# Patient Record
Sex: MALE
[De-identification: names, ages, dates, MRNs, and addresses within clinical notes are randomized per-mention and may not be internally consistent; named-entity substitution may affect disease eponyms.]

---

## 2019-02-24 ENCOUNTER — HOSPITAL ENCOUNTER (EMERGENCY)
Dept: HOSPITAL 95 - ER | Age: 79
Discharge: HOME | End: 2019-02-24
Payer: MEDICARE

## 2019-02-24 VITALS — BODY MASS INDEX: 27.47 KG/M2 | WEIGHT: 175 LBS | HEIGHT: 67 IN

## 2019-02-24 DIAGNOSIS — Z23: ICD-10-CM

## 2019-02-24 DIAGNOSIS — S01.412A: ICD-10-CM

## 2019-02-24 DIAGNOSIS — S01.81XA: ICD-10-CM

## 2019-02-24 DIAGNOSIS — Z79.01: ICD-10-CM

## 2019-02-24 DIAGNOSIS — Z79.899: ICD-10-CM
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
## 2019-02-24 DIAGNOSIS — S02.2XXA: Primary | ICD-10-CM

## 2019-02-24 LAB — PROTHROMBIN TIME: 19.3 SEC (ref 9.7–11.5)

## 2019-03-15 ENCOUNTER — HOSPITAL ENCOUNTER (INPATIENT)
Dept: HOSPITAL 95 - ER | Age: 79
LOS: 6 days | Discharge: NURSING, REHAB OR LONG-TERM CARE | DRG: 64 | End: 2019-03-21
Attending: HOSPITALIST | Admitting: HOSPITALIST
Payer: MEDICARE

## 2019-03-15 VITALS — BODY MASS INDEX: 23.14 KG/M2 | HEIGHT: 72.01 IN | WEIGHT: 170.86 LBS

## 2019-03-15 DIAGNOSIS — R00.1: ICD-10-CM

## 2019-03-15 DIAGNOSIS — Z95.2: ICD-10-CM

## 2019-03-15 DIAGNOSIS — G81.94: ICD-10-CM

## 2019-03-15 DIAGNOSIS — I25.10: ICD-10-CM

## 2019-03-15 DIAGNOSIS — I69.328: ICD-10-CM

## 2019-03-15 DIAGNOSIS — I10: ICD-10-CM

## 2019-03-15 DIAGNOSIS — I63.031: ICD-10-CM

## 2019-03-15 DIAGNOSIS — E78.5: ICD-10-CM

## 2019-03-15 DIAGNOSIS — Z95.1: ICD-10-CM

## 2019-03-15 DIAGNOSIS — I69.392: ICD-10-CM

## 2019-03-15 DIAGNOSIS — I63.511: Primary | ICD-10-CM

## 2019-03-15 LAB
ALBUMIN SERPL BCP-MCNC: 3.3 G/DL (ref 3.4–5)
ALBUMIN/GLOB SERPL: 1.1 {RATIO} (ref 0.8–1.8)
ALT SERPL W P-5'-P-CCNC: 26 U/L (ref 12–78)
ANION GAP SERPL CALCULATED.4IONS-SCNC: 5 MMOL/L (ref 6–16)
AST SERPL W P-5'-P-CCNC: 20 U/L (ref 12–37)
BASOPHILS # BLD AUTO: 0.06 K/MM3 (ref 0–0.23)
BASOPHILS NFR BLD AUTO: 1 % (ref 0–2)
BILIRUB SERPL-MCNC: 0.3 MG/DL (ref 0.1–1)
BUN SERPL-MCNC: 22 MG/DL (ref 8–24)
CALCIUM SERPL-MCNC: 8.1 MG/DL (ref 8.5–10.1)
CHLORIDE SERPL-SCNC: 105 MMOL/L (ref 98–108)
CO2 SERPL-SCNC: 27 MMOL/L (ref 21–32)
CREAT SERPL-MCNC: 0.86 MG/DL (ref 0.6–1.2)
DEPRECATED RDW RBC AUTO: 47.5 FL (ref 35.1–46.3)
EOSINOPHIL # BLD AUTO: 0.17 K/MM3 (ref 0–0.68)
EOSINOPHIL NFR BLD AUTO: 2 % (ref 0–6)
ERYTHROCYTE [DISTWIDTH] IN BLOOD BY AUTOMATED COUNT: 13.3 % (ref 11.7–14.2)
GLOBULIN SER CALC-MCNC: 3.1 G/DL (ref 2.2–4)
GLUCOSE SERPL-MCNC: 133 MG/DL (ref 70–99)
HCT VFR BLD AUTO: 42.6 % (ref 37–53)
HGB BLD-MCNC: 14.1 G/DL (ref 13.5–17.5)
IMM GRANULOCYTES # BLD AUTO: 0.01 K/MM3 (ref 0–0.1)
IMM GRANULOCYTES NFR BLD AUTO: 0 % (ref 0–1)
LYMPHOCYTES # BLD AUTO: 1.12 K/MM3 (ref 0.84–5.2)
LYMPHOCYTES NFR BLD AUTO: 15 % (ref 21–46)
MCHC RBC AUTO-ENTMCNC: 33.1 G/DL (ref 31.5–36.5)
MCV RBC AUTO: 95 FL (ref 80–100)
MONOCYTES # BLD AUTO: 0.62 K/MM3 (ref 0.16–1.47)
MONOCYTES NFR BLD AUTO: 9 % (ref 4–13)
NEUTROPHILS # BLD AUTO: 5.34 K/MM3 (ref 1.96–9.15)
NEUTROPHILS NFR BLD AUTO: 73 % (ref 41–73)
NRBC # BLD AUTO: 0 K/MM3 (ref 0–0.02)
NRBC BLD AUTO-RTO: 0 /100 WBC (ref 0–0.2)
PLATELET # BLD AUTO: 247 K/MM3 (ref 150–400)
POTASSIUM SERPL-SCNC: 4.2 MMOL/L (ref 3.5–5.5)
PROT SERPL-MCNC: 6.4 G/DL (ref 6.4–8.2)
PROTHROMBIN TIME: 11.4 SEC (ref 9.7–11.5)
SODIUM SERPL-SCNC: 137 MMOL/L (ref 136–145)

## 2019-03-15 NOTE — NUR
ASSUMED CARE OF PATIENT AT APPROXIMATELY 2125 FROM ED RN NANCI ALICEA  PATIENT
ARRIVED TO UNIT VIA STRETCHER; TRANSFER VIA SLIDE SHEET AND FOUR STAFF ASSIST
FROM ED TO PCU STRETCHER.  PATIENT ALERT AND ORIENTED X4; SLURRED SPEECH; LEFT
SIDED WEAKNESS AND DEFICIT; ABLE TO MOVE LEFT EXTREMITIES; LIMITED USE OF LEFT
HAND; LEFT SIDED FACIAL DROOP; PERRLA.  PATIENT ARRIVED TO UNIT WITH HEPARIN
INFUSING PER ORDER; NS STARTED BY PCU CHARGE RN.  PATIENT WAS INCONTINENT OF
X-LARGE STOOL UPON ARRIVAL TO UNIT; PATIENT REPORTS HE NORMALLY ALBE TO REPORT
WHEN NEEDS TO HAVE BM; ATTENDS PLACED.  PATIENT OUT OF BED LATER WITH TWO
ASSIST TO BEDSIDE COMMODE TO URINATE.  NPO; ASP PRECAUTIONS.  ADMISSION
COMPLETE.  WIFE AND DAUGHTER BEDSIDE FOR ABOUT TEN MINUTES; WENT HOME AND WILL
BE BACK IN AM.  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
SATURATION ABOVE 90% ON ROOM AIR.
PATIENT CURRENTLY SLEEPING IN BED; CALL LIGHT IN REACH; BED IN LOWEST
POSISTION; BED ALARM ON; WILL CONTINUE TO MONITOR AND ASSESS UNTIL END OF
SHIFT.

## 2019-03-16 LAB
ANION GAP SERPL CALCULATED.4IONS-SCNC: 8 MMOL/L (ref 6–16)
BUN SERPL-MCNC: 17 MG/DL (ref 8–24)
CALCIUM SERPL-MCNC: 7.9 MG/DL (ref 8.5–10.1)
CHLORIDE SERPL-SCNC: 109 MMOL/L (ref 98–108)
CO2 SERPL-SCNC: 24 MMOL/L (ref 21–32)
CREAT SERPL-MCNC: 0.8 MG/DL (ref 0.6–1.2)
DEPRECATED RDW RBC AUTO: 48.2 FL (ref 35.1–46.3)
ERYTHROCYTE [DISTWIDTH] IN BLOOD BY AUTOMATED COUNT: 13.6 % (ref 11.7–14.2)
GLUCOSE SERPL-MCNC: 130 MG/DL (ref 70–99)
HCT VFR BLD AUTO: 42.2 % (ref 37–53)
HGB BLD-MCNC: 14 G/DL (ref 13.5–17.5)
MCHC RBC AUTO-ENTMCNC: 33.2 G/DL (ref 31.5–36.5)
MCV RBC AUTO: 96 FL (ref 80–100)
NRBC # BLD AUTO: 0 K/MM3 (ref 0–0.02)
NRBC BLD AUTO-RTO: 0 /100 WBC (ref 0–0.2)
PLATELET # BLD AUTO: 226 K/MM3 (ref 150–400)
POTASSIUM SERPL-SCNC: 4.2 MMOL/L (ref 3.5–5.5)
PROTHROMBIN TIME: 11.5 SEC (ref 9.7–11.5)
SODIUM SERPL-SCNC: 141 MMOL/L (ref 136–145)

## 2019-03-16 NOTE — NUR
SHIFT SUMMARY.
 
NO ACUTE CHANGES NOTED THIS SHIFT. PT'S LEFT SIDED WEAKNESS HAS IMPROVED
SLIGHTLY, PT APPEARES TO BE TRACKING MORE WITH THIS LEFT EYE AND MOVEMENT OF
HIS LEFT HAND COMES MORE EASY TO HIM.
 
PT'S HR HAS BEEN IN THE 40'S TOUCHING THE HIGH 30'S. PT IS NOT SYMPTOMATIC AT
THIS TIME. PROVIDER WAS NOTIFIED, PT IS TAKING METORPOLOL AT HOME, THIS
MEDICATION WILL NOT BE GIVEN TO PT AT THIS TIME TO SEE IF HIS HR IMPROVES,
PT'S LISINOPRIL WAS STARTED THIS AFTERNOON. PT STATED TO THIS RN "LAST TIME I
HAD CARDIAC REHAB THEY HAD A HARD TIME GETTING MY HEART RATE UP WHERE THEY
WANTED IT."
 
PT HAD AN ECHO AND CAROTID DUPLEX TODAY (SEE RESULTS.)
 
PT SAW PT THIS AM, PT IS SBA W/FWW AND GAIT BELT TO CHAIR AND/OR BATHROOM.
 
PT HAS SEVERAL FAMILY MEMBERS AT THE BEDSIDE T/O SHIFT.
 
CALL LIGHT IN REACH, BED IS LOCKED AND LOW WILL CONTINUE TO MONITOR UNTIL
REPORT IS GIVEN TO ONCOMING RN.

## 2019-03-16 NOTE — NUR
AM NOTE.
 
ASSUMED CARE OF PT APROX 0700, PT IS A&Ox4. PT IS NEW ONSET ISCHEMIC STROKE
PT W/LEFT SIDED DEFICIT. PT'S LEFT SIDED WEAKNESS IS IMPROVING PER REPORT FROM
NOC SHIFT RN. PT HAS SLIGHT LEFT SIDED FACIAL DROOP,  OF THE LEFT HAND IS
SLIGHTLY WEAKER THAN THE RIGHT, WHEN PT WAS ASKED TO PUSH FEET AGAINST THIS
RN'S HANDS PT PULLED HIS LEFT LEG UP TO HIS CHEST. PT IS UN ABLE TO TRACK WITH
THE LEFT EYE BUT STATES THAT HE CAN. PT HAS NOTICABLE LEFT SIDED
NEGLECT/UNAWARENESS.
 
TELE INTACT, SB W/FIRSTDEGREE AND BBB IN THE 50'S PER MONITOR TECH, PT'S BP
139/62. NO EDEMA NOTED ON ASSESSMENT. L/S CLEAR T/O. BT PRESENT AND
HYPERACTIVE, ABD IS SOFT AND NONTENDER TO  PALP.
 
CALL LIGHT IN REACH, BED IS LOCKED AND LOW WILL CONTINUE TO MONITOR.

## 2019-03-16 NOTE — NUR
ASSUMED CARE OF PATIENT AT APPROXIMATELY 1900 FROM CARMELA KEMP RN.  PATIENT
ALERT AND ORIENTED X4; SLURRED SPEECH IMPROVING; MILD LEFT SIDED WEAKNESS
NEGLECT OF LEFT AT TIMES.  PATIENT HAND  EQUAL; IMPROVED COMPARED TO LAST
NIGHT.  PATIENT WAS FINISHING DINNER DURING SHIFT CHANGE.  LEFT SIDED FACIAL
DROOP.  PATIENT AMBULATES WITH SBA TO BATHROOM; IMPULSIVE AT TIMES.  MULTIPLE
FAMILY MEMBERS IN ROOM AT SHIFT CHANGE; FAMILY HAS WENT HOME AND WILL RETURN
IN AM.  PATIENT REPORTS HE WANTS TO GO HOME.  HEPARIN INFUSING PER ORDER.
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
ON ROOM AIR.
PATIENT CURRENTLY SLEEPING IN BED; CALL LIGHT IN REACH; BED IN LOWEST
POSISTION; BED ALARM ON; WILL CONTINUE TO MONITOR AND ASSESS UNTIL END OF
SHIFT.

## 2019-03-17 LAB — PROTHROMBIN TIME: 12 SEC (ref 9.7–11.5)

## 2019-03-17 NOTE — NUR
REASSESSMENT:
PT HAS BEEN RESTING IN BED FOR MOST OF THE MORNING. HE REMAINS WITHDRAWN, NOT
TALKING MUCH WITH STAFF OR THE VISITORS AT THE BEDSIDE. HIS LEFT EYE LID IS A
LITTLE DROOPY AND HAD SOME CRUSTING ON IT THAT WAS CLEANED OFF. HIS LEFT SIDE
CONTINUES TO BE WEAK. HE AMBULATED IN THE ADORNO AND WAS QUITE IMPULSIVE WITH
HIS MOVEMENTS. WHEN HE IS TOLD TO GO A CERTAIN WAY HE MAKES SUDDEN, RUSHED
MOVEMENTS. HE TENDED TO VEER TOWARD THE LEFT WHILE WALKING AND SEEMED NOT TO
NOTICE THAT HE WAS GOING TO RUN INTO THINGS ON THE LEFT SIDE, WHETHER IT WAS
BECAUSE HE DIDN'T SEE IT OR HIS DEPTH PERCEPTION IS IMPAIRED. OTHERWISE LUNGS
HAVE BEEN CLEAR, REMAINS ON RA, EATING WELL. CONTINUING TO MONITOR.

## 2019-03-17 NOTE — NUR
SHIFT SUMMARY: PT CONTINUES TO BE ALERT AND ORIENTED. L SIDE REMAINS WEAK.
WHEN PT TALKS SPEECH IS CLEAR, BUT HE DOESN'T SPEAK VERY OFTEN. LUNGS ARE
CLEAR, RA, SB IN THE 40S AND 50S - ASYMPTOMATIC. FAMILY SPENT A MAJORITY OF
THE DAY AT THE BEDSIDE. ALL HAVE BEEN UPDATED AND QUESTIONS ANSWERED.

## 2019-03-17 NOTE — NUR
PATIENT SLEPT ABOUT TEN HOURS LAST NIGHT.  DID NO CALL BEFORE AMBULATION;
IMPULSIVE TO BATHROOM; SLIGHLY UNSTEADY.  VSS. HEPARIN INFUSION ADJSUTED ONCE.
NO OTHER ACUTE CHANGES TO REPORT.  WILL CONTINUE TO MONITOR AND ASSESS UNTIL
END OF SHIFT.

## 2019-03-17 NOTE — NUR
PAIN:
PT COMPLAINING OF 4/10 HEADACHE, NO PAIN MEDS AVAILABLE. DR. BURCH NOTIFIED AND
RECEIVED ORDER FOR TYLENOL, SEE ORDERS.

## 2019-03-17 NOTE — NUR
ASSUMED CARE OF PATIENT AT APPROXIMATELY 1900 FROM TAWNY RED RN.  PATIENT
ALERT AND ORIENTED X4; SLURRED SPEECH CONTINUES TO IMPROVE;  MILD LEFT SIDED
WEAKNESS AND NEGLECT OF LEFT AT TIMES.  PATIENT HAND  EQUAL; LEFT SIDED
FACIAL DROOP.  PATIENT AMBULATES WITH SBA TO BATHROOM; IMPULSIVE. PATIENT
DENIES PAIN, NUMBNESS, TINGLING, DIZZINESS AND NAUSEA.  MULTIPLE FAMILY
MEMBERS IN ROOM AT SHIFT CHANGE; FAMILY HAS WENT HOME AND WILL RETURN
IN AM.  PATIENT REPORTS HE WANTS TO GO HOME.  HEPARIN INFUSING PER ORDER.
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
ON ROOM AIR.
PATIENT CURRENTLY SLEEPING IN BED; CALL LIGHT IN REACH; BED IN LOWEST
POSISTION; BED ALARM ON; WILL CONTINUE TO MONITOR AND ASSESS UNTIL END OF
SHIFT.

## 2019-03-18 LAB — PROTHROMBIN TIME: 16.3 SEC (ref 9.7–11.5)

## 2019-03-18 NOTE — NUR
SHIFT SUMMARY
 
Assumed care of pt at 0700. Report recieved from Alecia ONEILL. Pt worked with
occupational therapy early this morning. Pt on room air. Bradycardic, but
tolerates activity well. Pt walked with physical and occupational therapy
today. Pt has not triggered bed alarm this shift. Pt had visitors for majority
of this morning, but is alone and sleeping at this time. Bed in lowest
position. Call light in reach. Pt denies need at this time.

## 2019-03-18 NOTE — NUR
PATIENT COMPLAINED OF HEADACHE; MEDICATED PER EMAR.  PATIENT SLEPT ABOUT TEN
HOURS LAST NIGHT.  WILL CONTINUE TO MONITOR AND ASSESS UNTIL END OF SHIFT.

## 2019-03-18 NOTE — NUR
Advance Directive education/Spiritual care visit conducted. Patient was lying
in bed and resting when I entered patient's room. Patient easily awoke to the
sound of his name. I explained who I was and the purpose of my visit. Patient
stated that he had a stroke and that he has slurred speech and loss of some
coordination. I asked patient if I could pray for him and he said, "please
do." I provided prayer. I also asked him about advance directive and patient
stated that he understood what an Advance Directive is and would not like to
discuss it at this time. Patient thanked me for the visit.

## 2019-03-19 LAB
PLATELET # BLD AUTO: 225 K/MM3 (ref 150–400)
PROTHROMBIN TIME: 19 SEC (ref 9.7–11.5)

## 2019-03-19 NOTE — NUR
SHIFT SUMMARY
 
Assumed care of pt at 0700. Report recieved from Kevin ONEILL. Pt on room air.
Mobilizes around room with one person assist, using FWW and gait belt. Chair
and bed alarms utilized. Pt on room air. Bradycardic per telemetry, which is
pt's baseline. Family in to see pt intermittently throughout day. No acute
changes since shift assessment. Pt assigned to room 309. Report given to
medical floor NINO Li. Pt transferred to new room via wheelchair with Marcela GALAVN. Medications, chart, and belongings transferred with patient.

## 2019-03-19 NOTE — NUR
SHIFT SUMMARY
PATIENT PLEASENT AND COOPERATIVE THROUGHOUT THE NIGHT. PATIENT REPORTED HE
NAPPED ON AND OFF THROUGHOUT THE BEGINNING OF THE NIGHT BUT HAS APPEARED TO
SLEEP WELL DURING THE SECOND HALF OF THE NIGHT. PATIENT MEDICATED FOR A
HEADACHE PER EMAR. PATIENT ABLE TO MOVE SLEF ABOUT IN BED. PATIENT CONTINUES
TO APPEAR TO HAVE EQUAL  BUT HAS TROUBLE WITH DOING/SEEING THINGS ON HIS
LEFT SIDE. WHEN PATIENT UP USING THE WALKER WITH ASSISTANCE HE WILL OFTEN RUN
THE LEFT SIDE OF THE WALKER INTO THINGS SUCH AS THE DOOR FRAM. PATIENT REPORTS
THAT HE DOES NOT FEEL LIKE ONE SIDE OF HIS BODY IS WEAKER THAN THE OTHER.
HEPARIN GTT RUNNINER PER ORDERS.  PATIENT CURRENTLY APPEARS TO BE ASLEEP AT
THIS TIME. WILL CONTINUE TO MONITOR PATIENT AND REPORT TO ONCOMING RN.

## 2019-03-19 NOTE — NUR
Patient was lying in bed and alert. Because [facility] is
established patientopenly shared about his frustrations about the stroke, his
family and his property in [place]. I listened empathically, provided
companionship and a calming presence. Patient responded well and thanked me
for the visit.

## 2019-03-19 NOTE — NUR
SHIFT SUMMARY.
PT TRANFERED TO MEDICAL FLOOR AT 1705. PT TRANSFERED WITH SBA WITH FWW.
HEPERIN DRIP INFUSING. PT WITH MINOR L SIDED WEAKNESS AND L SIDED NEGLECT WITH
AMBULATION. PT AMBULATED TO BATHROOM WITH  CGA WITH FWW AND GB. PT DENIES
PAIN, SOB, N/V. GOOD DINNER INTAKE. FAMILY AT BEDSIDE AT THIS TIME.

## 2019-03-19 NOTE — NUR
Initial Visit:
 
Palliative Care Consult for Advanced Care Planning.
 
Pt is A&Ox4 and reports 4/10 headache pain. He reports a tolerable pain level
is 5/10. He also reports current regimen is managing his pain. He denies
dyspnea, anxiety, and depression.
 
Engaged in theapeutic conversation regarding goals of care. Pt reports that he
lives at home with his wife and daughter. He is of [employer] caleb and reports
adequate support at home with his family who can assist with any needs of
care. Pt states before this hospitalization he was independent requiring no
assistance with his ADLs. He reports now requiring the use of a FWW. Pt
reports that his goal is to return to a normal level of function. He report
the Drs told him his recovery may take quite some time but he is optimistic
with his goal. Discussed AD/POLST with Pt. Pt reports that he has a completed
advance directive at home. Pt reports his wishes are to remain a full code and
full treatment as long as there is a chance of a meaningul recovery. Pt
reports that he will have family bring in copy of AD for [facility] medical
records. Pt reports no concerns at this time.
 
Spoke with Pt's nurse Ruth and she reports no concerns at this time.
 
Plan: Obtain copy of advance directive when available. Will remain available.

## 2019-03-20 LAB
ANION GAP SERPL CALCULATED.4IONS-SCNC: 7 MMOL/L (ref 6–16)
BUN SERPL-MCNC: 21 MG/DL (ref 8–24)
CALCIUM SERPL-MCNC: 8.4 MG/DL (ref 8.5–10.1)
CHLORIDE SERPL-SCNC: 108 MMOL/L (ref 98–108)
CHOLEST SERPL-MCNC: 137 MG/DL (ref 50–200)
CHOLEST/HDLC SERPL: 3.9 {RATIO}
CO2 SERPL-SCNC: 24 MMOL/L (ref 21–32)
CREAT SERPL-MCNC: 0.81 MG/DL (ref 0.6–1.2)
DEPRECATED RDW RBC AUTO: 47 FL (ref 35.1–46.3)
ERYTHROCYTE [DISTWIDTH] IN BLOOD BY AUTOMATED COUNT: 13.6 % (ref 11.7–14.2)
GLUCOSE SERPL-MCNC: 114 MG/DL (ref 70–99)
HCT VFR BLD AUTO: 43.3 % (ref 37–53)
HDLC SERPL-MCNC: 35 MG/DL (ref 39–?)
HGB BLD-MCNC: 14.4 G/DL (ref 13.5–17.5)
LDLC SERPL CALC-MCNC: 81 MG/DL (ref 0–110)
LDLC/HDLC SERPL: 2.3 {RATIO}
MCHC RBC AUTO-ENTMCNC: 33.3 G/DL (ref 31.5–36.5)
MCV RBC AUTO: 95 FL (ref 80–100)
NRBC # BLD AUTO: 0 K/MM3 (ref 0–0.02)
NRBC BLD AUTO-RTO: 0 /100 WBC (ref 0–0.2)
PLATELET # BLD AUTO: 212 K/MM3 (ref 150–400)
POTASSIUM SERPL-SCNC: 4.3 MMOL/L (ref 3.5–5.5)
PROTHROMBIN TIME: 24.3 SEC (ref 9.7–11.5)
SODIUM SERPL-SCNC: 139 MMOL/L (ref 136–145)
TRIGL SERPL-MCNC: 106 MG/DL (ref 30–160)
VLDLC SERPL CALC-MCNC: 21 MG/DL (ref 6–32)

## 2019-03-20 NOTE — NUR
adjusted drip to conform to pharmacy order, room air, no s/sx of infiltration
or infection at IV site, call light in reach, walking rounds done with day
staff.

## 2019-03-20 NOTE — NUR
PT IS ALERT AND ORIENTED AND COOPERATIVE WITH CARE. HE IS OUT OF BED FOR MEALS
AND FOR COMFORT. HIS FAMILY IS AT THE BEDSIDE. NO COMPLAINTS OF PAIN. PT IS A
SBA TO THE BATHROOM. PT HAS LEFT SIDED WEAKNESS. HEPARIN DRIP HAS BEEN DC'D AT
1323. PATIENT PARTICIPATED WITH PHYSICAL THERAPY TODAY. WILL CONTINUE TO
MONITOR.

## 2019-03-20 NOTE — NUR
PT RESTING IN BED SINCE WORKING WITH OCCUPATIONAL THERAPY THIS AFTERNOON. HE
BECOMES UNSTEADY WHEN TIRED AND HIS LEFT SIDED WEAKNESS IS MORE PROMINENT.
PT'S FAMILY IS NO LONGER AT THE BEDSIDE. HIS IV IS SALINE LOCKED. NO ACTUE
CHANGES TODAY, WILL CONTINUE TO MONITOR.

## 2019-03-21 LAB — PROTHROMBIN TIME: 23.8 SEC (ref 9.7–11.5)

## 2019-03-21 NOTE — NUR
PT DISCHARGED TO [facility]. PT TRANSPORTED BY [facility] [employer]
[facility] [employer]. PATIENT'S SPOUSE NOTIFIED OF THE TIME HE WAS DISCHARGING.
[facility]

## 2019-03-21 NOTE — NUR
PT IS ALERT AND ORIENTED AND COOPERATIVE WITH CARE. THE PATIENT'S SPOUSE AND
DAUGHTER ARE AT THE BEDSIDE NOW. HIS FAMILY WOULD LIKE TO KNOW IF THERE IS BED
AVAILABILITY AT [facility] FOR THE PATIENT. IF THERE IS
NOT, THEY WOULD LIKE TO BE DISCHARGED HOME WITH HOME HEALTH. THE PATIENT HAS
MEDICAL APPOINTMENTS SCHEDULED FOR TOMORROW AND IS EAGER TO BE DISCHARGED FROM
THE HOSPITAL SO HE CAN MAKE THEM.

## 2019-03-21 NOTE — NUR
SHIFT SUMMARY
PT SLEPT WELL DURING THE NIGHT. AT TIMES FORGETS TO RING FOR ASSISTANCE TO THE
BATHROOM. BED ALARM ON AND PT ASSISTED WITH FWW. NO ACUTE EVENTS OVER NIGHT,
WILL CONTINUE TO MONITOR.

## 2021-04-06 NOTE — NUR
SHIFT SUMMARY;
 
ASSUMED CARE AT 0700, REPORT FROM NINO SHERMAN. A/A/OX4 DURING SHIFT. PACEMAKER
PLACED TODAY FOR 3RD DEGREE BLOCK. TOLERATED PROCEDURE WELL. SLING IN PLACE
IMMEDIATLY AFTER PROCEDURE TO LEFT ARM. DRESSING OVER SITE ON LEFT CHEST WALL
DRY AND INTACT, NO BLEEDING OR HEMATOMA AT SITE. ICE PACK PROVIDED. VSS. WILL
CONTINUE TO MONITOR AND TREAT UNTIL CHANGE OF SHIFT.

## 2021-04-06 NOTE — NUR
NIGHT SHIFT SUMMARY
PT IS AXO X4. PT HAS HAD A HR BETWEEN 34-45 BPM THROUGHOUT THE NIGHT. PT HAS
BEEN ASYMPTOMATIC AND DENYING ANY DIZZINESS WHILE AMBULATING. PT ATTEMPTED TO
VOID SEVERAL TIMES AND WAS NOT ABLE TO PRODUCE ANY URINE AT ALL SO A BLADDER
SCAN WAS DONE WHICH SHOWED >750ML. ORDER FOR SHARP OBTAINED WHICH PT WAS
GRATEFUL FOR AS HE WAS NOT ABLE TO SLEEP DUE TO NEEDING TO PEE.VITAL SIGNS
HAVE REMAINED STABLE AND PT DENIES FEELING ANY DIIFERENT THAN USUAL. NS
RUNNING AT 100ML/HR, WCTM.

## 2021-04-07 NOTE — NUR
MORNING UPDATE
PT ARRIVED BACK FROM XRAY DURING MORNING REPORT. PT HAS SHARP DRAINING AND IN
PLACE. PT REPORTS ISSUES WITH RETENTION EACH TIME HE IS AT THE HOSPITAL,
HOWEVER AS SOON AS HE RETURNS HOME THERE ARE NO ISSUES. PT WILL HAVE SHARP
REMOVED PRIOR TO DISCHARGE AND IS ENCOURAGED TO RETURN TO THE HOSPITAL IF NO
URINE OUPUT AFTER RETURNING HOME. PT VERBALIZES UNDERSTANDING. PT RECEIVED
PACER PLACEMENT AFTERCARE, INSTRUCTIONS FROM DR. CONNELLY. SLING IS IN PLACE. PT
ENCOURAGED TO MINIMILIZE USE OF THE LEFT SHOULDER. PT IS RESTING IN BED AT
THIS TIME

## 2021-04-07 NOTE — NUR
shift summary
 
pt rested well through the night. alert and oriented, able to make needs
known. cooperative with plan of care. sats >90% on room air. tele  - paced
rate 60. pacemaker site c/d/i - no c/o pain. camara draining to gravity, belkis
care performed. no bm. vss.
 
call light within reach, bed in lowest position. will continue to monitor.

## 2021-04-07 NOTE — NUR
DISCHARGE
PT HAS BEEN PREPARED AND CLEARED FOR DISCHARGE. PT'S SHARP CATHETER HAS BEEN
REMOVED, THE IV'S HAVE BEEN REMOVED ALL WITHIN NORMAL LIMITS; PT TOLERATED
WELL. TELE HAS BEEN REMOVED. PT HAS BEEN GIVEN INSTRUCTIONS ON HOW TO CARE FOR
HIS PACER SITE AND WHEN FOLLOW UP APPOINTMENTS ARE. PT HAS BEEN GIVEN A
PRESCRIPTION FOR KEFLEX THAT HAS BEEN SENT TO HIS PHARMACY OF CHOICE; [facility]
IN [place]. PT IS DRESSED, PT ON RA AND DENIES CP AND SOB. PT IS WAITING
FAMILY TO COME PICK HIM UP.